# Patient Record
Sex: FEMALE | Race: BLACK OR AFRICAN AMERICAN | URBAN - METROPOLITAN AREA
[De-identification: names, ages, dates, MRNs, and addresses within clinical notes are randomized per-mention and may not be internally consistent; named-entity substitution may affect disease eponyms.]

---

## 2018-08-30 ENCOUNTER — ED HISTORICAL/CONVERTED ENCOUNTER (OUTPATIENT)
Dept: OTHER | Age: 33
End: 2018-08-30

## 2023-03-31 ENCOUNTER — HOSPITAL ENCOUNTER (EMERGENCY)
Age: 38
Discharge: HOME | End: 2023-03-31
Payer: COMMERCIAL

## 2023-03-31 VITALS
SYSTOLIC BLOOD PRESSURE: 120 MMHG | DIASTOLIC BLOOD PRESSURE: 77 MMHG | WEIGHT: 188 LBS | OXYGEN SATURATION: 99 % | BODY MASS INDEX: 32.1 KG/M2 | HEART RATE: 107 BPM | RESPIRATION RATE: 16 BRPM | HEIGHT: 64 IN | TEMPERATURE: 99.1 F

## 2023-03-31 DIAGNOSIS — R21 RASH AND OTHER NONSPECIFIC SKIN ERUPTION: Primary | ICD-10-CM

## 2023-03-31 PROCEDURE — 74011250636 HC RX REV CODE- 250/636: Performed by: NURSE PRACTITIONER

## 2023-03-31 PROCEDURE — 99284 EMERGENCY DEPT VISIT MOD MDM: CPT | Performed by: NURSE PRACTITIONER

## 2023-03-31 PROCEDURE — 96372 THER/PROPH/DIAG INJ SC/IM: CPT | Performed by: NURSE PRACTITIONER

## 2023-03-31 PROCEDURE — 74011250637 HC RX REV CODE- 250/637: Performed by: NURSE PRACTITIONER

## 2023-03-31 RX ORDER — PREDNISONE 50 MG/1
50 TABLET ORAL DAILY
Qty: 5 TABLET | Refills: 1 | Status: SHIPPED | OUTPATIENT
Start: 2023-03-31 | End: 2023-04-05

## 2023-03-31 RX ORDER — DIPHENHYDRAMINE HCL 25 MG
50 CAPSULE ORAL
Qty: 30 CAPSULE | Refills: 0 | Status: SHIPPED | OUTPATIENT
Start: 2023-03-31 | End: 2023-04-10

## 2023-03-31 RX ORDER — FAMOTIDINE 20 MG/1
20 TABLET, FILM COATED ORAL DAILY
Qty: 30 TABLET | Refills: 0 | Status: SHIPPED | OUTPATIENT
Start: 2023-03-31 | End: 2023-04-30

## 2023-03-31 RX ORDER — LORATADINE 10 MG/1
10 TABLET ORAL DAILY
Qty: 30 TABLET | Refills: 2 | Status: SHIPPED | OUTPATIENT
Start: 2023-03-31

## 2023-03-31 RX ORDER — BETAMETHASONE DIPROPIONATE 0.5 MG/G
OINTMENT TOPICAL 2 TIMES DAILY
Qty: 50 G | Refills: 0 | Status: SHIPPED | OUTPATIENT
Start: 2023-03-31

## 2023-03-31 RX ORDER — FAMOTIDINE 20 MG/1
20 TABLET, FILM COATED ORAL
Status: COMPLETED | OUTPATIENT
Start: 2023-03-31 | End: 2023-03-31

## 2023-03-31 RX ADMIN — METHYLPREDNISOLONE SODIUM SUCCINATE 125 MG: 125 INJECTION, POWDER, FOR SOLUTION INTRAMUSCULAR; INTRAVENOUS at 16:19

## 2023-03-31 RX ADMIN — FAMOTIDINE 20 MG: 20 TABLET ORAL at 16:16

## 2023-03-31 NOTE — ED PROVIDER NOTES
[facility] EMERGENCY DEPARTMENT  EMERGENCY DEPARTMENT HISTORY AND PHYSICAL EXAM      Date: 3/31/2023  Patient Name: Charli Simon  MRN: [id number]  [place]: 1985  Date of evaluation: 3/31/2023  Provider: Jasper Felipe NP   Note Started: 4:17 PM 3/31/23    HISTORY OF PRESENT ILLNESS     Chief Complaint   Patient presents with    Rash       History Provided By: Patient    HPI: Charli Simon is a 40 y.o. female presented to the ED with a rash. Patient states she has a history of eczema and has been treating it as such but her symptoms have worsened. The rash started on her face and has progressed to her arms and hands. Rash is itchy and has a rough texture. PAST MEDICAL HISTORY   Past Medical History:  History reviewed. No pertinent past medical history. Past Surgical History:  Past Surgical History:   Procedure Laterality Date    HX  SECTION         Family History:  History reviewed. No pertinent family history. Social History:  Social History     Tobacco Use    Smoking status: Some Days     Types: Cigarettes     Passive exposure: Never    Smokeless tobacco: Never   Vaping Use    Vaping Use: Never used   Substance Use Topics    Alcohol use: Yes     Comment: occasionally    Drug use: Never       Allergies: Allergies   Allergen Reactions    Clams Anaphylaxis       PCP: Wesley Diggs DO    Current Meds:   Discharge Medication List as of 3/31/2023  4:44 PM          PHYSICAL EXAM     ED Triage Vitals [23 1603]   ED Encounter Vitals Group      /77      Pulse (Heart Rate) (!) 107      Resp Rate 16      Temp 99.1 °F (37.3 °C)      Temp src       O2 Sat (%) 99 %      Weight 188 lb      Height 5' 4\"      Physical Exam  Vitals and nursing note reviewed. Constitutional:       Appearance: Normal appearance. Cardiovascular:      Rate and Rhythm: Normal rate and regular rhythm. Pulses: Normal pulses. Heart sounds: Normal heart sounds.    Pulmonary:      Effort: Pulmonary effort is normal.      Breath sounds: Normal breath sounds. Skin:     Capillary Refill: Capillary refill takes less than 2 seconds. Findings: Rash present. Neurological:      Mental Status: She is alert. SCREENINGS              LAB, EKG AND DIAGNOSTIC RESULTS   Labs:  No results found for this or any previous visit (from the past 12 hour(s)). EKG: Initial EKG interpreted by me. Not Applicable    Radiologic Studies:  Non-plain film images such as CT, Ultrasound and MRI are read by the radiologist. Plain radiographic images are visualized and preliminarily interpreted by the ED Physician with the following findings: Not Applicable    Interpretation per the Radiologist below, if available at the time of this note:  No results found. PROCEDURES   Unless otherwise noted below, none. Procedures      CRITICAL CARE TIME   Patient does not meet Critical Care Time, 0 minutes    ED COURSE and DIFFERENTIAL DIAGNOSIS/MDM   CC/HPI Summary, DDx, ED Course, and Reassessment: Patient presents with rash. DDx: allergic reaction, contact dermatitis, viral exanthem, staph infection. Will treat with benadryl, pepcid, steroids. No indication for EpiPen at this time. Discussed the nature of allergic reaction to the patient. Hives are generally related to foods, sun, heat, cold or viruses. Plan is to use over the counter benadryl 50mg every 6 hours until the rash resolves. Please also take Steroids as prescribed for next few days and Pepcid twice a day as prescribed. Call if symptoms persist or worsen. If you have shortness of breath or severe lip/tongue swelling, return to the ER immediately.      Records Reviewed (source and summary of external notes): Prior medical records and Nursing notes    Vitals:    Vitals:    03/31/23 1603   BP: 120/77   Pulse: (!) 107   Resp: 16   Temp: 99.1 °F (37.3 °C)   SpO2: 99%   Weight: 85.3 kg (188 lb)   Height: 5' 4\" (1.626 m)        ED COURSE       Disposition Considerations (Tests not done, Shared Decision Making, Pt Expectation of Test or Treatment.): Not Applicable    Patient was given the following medications:  Medications   methylPREDNISolone (PF) (Solu-MEDROL) injection 125 mg (125 mg IntraMUSCular Given 3/31/23 1619)   famotidine (PEPCID) tablet 20 mg (20 mg Oral Given 3/31/23 1616)       CONSULTS: (Who and What was discussed)  None     Social Determinants affecting Dx or Tx: None    Smoking Cessation: Not Applicable    FINAL IMPRESSION     1. Rash and other nonspecific skin eruption          DISPOSITION/PLAN   Discharged    Discharge Note: The patient is stable for discharge home. The signs, symptoms, diagnosis, and discharge instructions have been discussed, understanding conveyed, and agreed upon. The patient is to follow up as recommended or return to ER should their symptoms worsen. PATIENT REFERRED TO:  Follow-up Information       Follow up With Specialties Details Why Contact Kyleigh Hunt, DO Family Medicine  If symptoms worsen [address]  [facility] [address] [address] [phone]      [address] [facility] Emergency Medicine  If symptoms worsen [address] [facility] [id number]  [address] [place] [address] Dermatology   If symptoms worsen [address] [facility]  [phone]              DISCHARGE MEDICATIONS:  Discharge Medication List as of 3/31/2023  4:44 PM        START taking these medications    Details   augmented betamethasone dipropionate (Diprolene, augmented,) 0.05 % ointment Apply  to affected area two (2) times a day., Normal, Disp-50 g, R-0      famotidine (Pepcid) 20 mg tablet Take 1 Tablet by mouth daily for 30 days. , Normal, Disp-30 Tablet, R-0      predniSONE (DELTASONE) 50 mg tablet Take 1 Tablet by mouth daily for 5 days. , Normal, Disp-5 Tablet, R-1      loratadine (Claritin) 10 mg tablet Take 1 Tablet by mouth daily.  Indications: hives, Normal, Disp-30 Tablet, R-2      diphenhydrAMINE (BenadryL) 25 mg capsule Take 2 Capsules by mouth nightly as needed for Itching for up to 10 days. , Normal, Disp-30 Capsule, R-0               DISCONTINUED MEDICATIONS:  Discharge Medication List as of 3/31/2023  4:44 PM          I am the Primary Clinician of Record: Nacho Pelayo NP (electronically signed)    (Please note that parts of this dictation were completed with voice recognition software. Quite often unanticipated grammatical, syntax, homophones, and other interpretive errors are inadvertently transcribed by the computer software. Please disregards these errors.  Please excuse any errors that have escaped final proofreading.)

## 2023-03-31 NOTE — Clinical Note
[address]  [address] [place] [id number]  [phone]    Work/School Note    Date: 3/31/2023    To Whom It May concern:    Lorenz Lennox was seen and treated today in the emergency room by the following provider(s):  Nurse Practitioner: Corrie Stephens NP. Lorenz Lennox is excused from work/school on 3/31/2023 through 4/2/2023. She is medically clear to return to work/school on 4/3/2023.          Sincerely,          Azalea Chahal NP

## 2023-03-31 NOTE — ED TRIAGE NOTES
Pt reports itchy painful rash on hands/arms and face since Wednesday. Pt states she does have hx of eczema so was treating as such.

## 2023-03-31 NOTE — DISCHARGE INSTRUCTIONS
Please take medications as prescribed. Please apply cream to affected area two times a day. Please take Benadryl once home, do not drive or operate heavy equipment. Medication may cause drowsiness.